# Patient Record
Sex: MALE | Race: WHITE | HISPANIC OR LATINO | Employment: OTHER | URBAN - METROPOLITAN AREA
[De-identification: names, ages, dates, MRNs, and addresses within clinical notes are randomized per-mention and may not be internally consistent; named-entity substitution may affect disease eponyms.]

---

## 2020-03-17 ENCOUNTER — HOSPITAL ENCOUNTER (EMERGENCY)
Facility: HOSPITAL | Age: 25
Discharge: HOME | End: 2020-03-17
Attending: EMERGENCY MEDICINE

## 2020-03-17 VITALS
DIASTOLIC BLOOD PRESSURE: 79 MMHG | OXYGEN SATURATION: 98 % | RESPIRATION RATE: 18 BRPM | BODY MASS INDEX: 35.55 KG/M2 | HEART RATE: 73 BPM | WEIGHT: 240 LBS | SYSTOLIC BLOOD PRESSURE: 133 MMHG | TEMPERATURE: 99 F | HEIGHT: 69 IN

## 2020-03-17 DIAGNOSIS — R05.9 COUGH: ICD-10-CM

## 2020-03-17 DIAGNOSIS — Z71.89 ADVICE GIVEN ABOUT COVID-19 VIRUS INFECTION: Primary | ICD-10-CM

## 2020-03-17 DIAGNOSIS — J06.9 VIRAL URI WITH COUGH: ICD-10-CM

## 2020-03-17 PROCEDURE — 99283 EMERGENCY DEPT VISIT LOW MDM: CPT | Mod: 25

## 2020-03-18 NOTE — ED TRIAGE NOTES
Pt. Reports he just in because of his wife. Pt. Reports he has a dry cough, denies any fever. Pt. Reports he has not other symptoms.

## 2020-03-18 NOTE — ED PROVIDER NOTES
Encounter Date: 3/17/2020       History     Chief Complaint   Patient presents with    Cough     non productive cough for two days. no n/v/ fever chills. denies taking any medication for anything     Chief Complaint:  Cough  History of  Present Illness: History obtained from patient. This 24 y.o. male who has no known past medical history presents to the ED complaining of nonproductive cough for 2 days with associated congestion, rhinorrhea and diarrhea.  Denies fever, chest pain, shortness of breath, myalgias, headache, dizziness, weakness, sore throat.  Patient states that his wife and father in law are ill with similar symptoms.  No recent travel.  No recent contact with individuals positive for COVID-19.          Review of patient's allergies indicates:   Allergen Reactions    Amoxicillin Swelling     Throat swelling     History reviewed. No pertinent past medical history.  History reviewed. No pertinent surgical history.  History reviewed. No pertinent family history.  Social History     Tobacco Use    Smoking status: Never Smoker    Smokeless tobacco: Never Used   Substance Use Topics    Alcohol use: Yes     Frequency: Never    Drug use: Never     Review of Systems   Constitutional: Negative for chills and fever.   HENT: Positive for congestion and rhinorrhea. Negative for sore throat.    Eyes: Negative for visual disturbance.   Respiratory: Positive for cough. Negative for shortness of breath.    Cardiovascular: Negative for chest pain.   Gastrointestinal: Positive for diarrhea. Negative for abdominal pain, nausea and vomiting.   Genitourinary: Negative for dysuria, frequency and hematuria.   Musculoskeletal: Negative for back pain.   Skin: Negative for rash.   Neurological: Negative for dizziness, weakness and headaches.       Physical Exam     Initial Vitals [03/17/20 1918]   BP Pulse Resp Temp SpO2   (!) 154/80 80 15 98.7 °F (37.1 °C) 99 %      MAP       --         Physical Exam    Nursing note and  vitals reviewed.  Constitutional: He appears well-developed and well-nourished. No distress.   HENT:   Head: Normocephalic and atraumatic.   Right Ear: Tympanic membrane normal.   Left Ear: Tympanic membrane normal.   Nose: Nose normal.   Mouth/Throat: Uvula is midline, oropharynx is clear and moist and mucous membranes are normal.   Eyes: EOM are normal. Pupils are equal, round, and reactive to light.   Neck: Trachea normal, normal range of motion, full passive range of motion without pain and phonation normal. Neck supple. No stridor present. No spinous process tenderness and no muscular tenderness present. Normal range of motion present. No neck rigidity.   Cardiovascular: Normal rate, regular rhythm and normal heart sounds. Exam reveals no gallop and no friction rub.    No murmur heard.  Pulmonary/Chest: Effort normal and breath sounds normal. No respiratory distress. He has no wheezes. He has no rhonchi. He has no rales.   Abdominal: Soft. Bowel sounds are normal. He exhibits no mass. There is no tenderness. There is no rebound and no guarding.   Musculoskeletal: Normal range of motion.   Neurological: He is alert and oriented to person, place, and time. He has normal strength. No cranial nerve deficit or sensory deficit.   Skin: Skin is warm and dry. Capillary refill takes less than 2 seconds.   Psychiatric: He has a normal mood and affect.         ED Course   Procedures  Labs Reviewed - No data to display       Imaging Results          X-Ray Chest 1 View (Final result)  Result time 03/17/20 20:27:10    Final result by Jaki Martin MD (03/17/20 20:27:10)                 Impression:      No focal consolidation.  Low lung volumes.      Electronically signed by: Jaki Martin  Date:    03/17/2020  Time:    20:27             Narrative:    EXAMINATION:  CHEST ONE VIEW    CLINICAL HISTORY:  Cough    TECHNIQUE:  One view of the chest.    COMPARISON:  None.    FINDINGS:  The cardiac silhouette is exaggerated  by low lung volumes.    There is no focal consolidation, pneumothorax, or pleural effusion.                                 Medical Decision Making:   ED Management:  This is an evaluation of a 24 y.o. male that presents to the Emergency Department for constellation of symptoms likely representing viral illness.  The patient is a non-toxic, afebrile, and well appearing male. On physical exam ears and pharynx are without evidence of infection. Appears well hydrated with moist mucus membranes. Neck soft and supple with no meningeal signs or cervical lymphadenopathy. Breath sounds are clear and equal bilaterally with no adventitious breath sounds, tachypnea or respiratory distress with room air pulse ox of 98% and no evidence of hypoxia.     Vital Signs Are Reassuring.  RESULTS:  Chest x-ray shows no acute cardiopulmonary processes.    My overall impression is Viral URI. I considered, but at this time, do not suspect OM, OE, strep pharyngitis, meningitis, pneumonia, or acute bacterial sinusitis.    The diagnosis, treatment plan, instructions for follow-up and reevaluation with PCP as well as ED return precautions were discussed and understanding was verbalized. All questions or concerns have been addressed.     Given absence of concerning symptoms including shortness of breath, body aches and fever as well as no known risk factors, I do not believe the patient requires COVID-19 testing at this time. Recommended patient to practice good hygiene as well as avoid contact with elderly or immunocompromised individuals.                                  Clinical Impression:       ICD-10-CM ICD-9-CM   1. Advice Given About Covid-19 Virus Infection Z71.89    2. Cough R05 786.2   3. Viral URI with cough J06.9 465.9    B97.89              ED Disposition Condition    Discharge Stable        ED Prescriptions     None        Follow-up Information     Follow up With Specialties Details Why Contact Info    [facility]   Schedule an appointment as soon as possible for a visit in 1 day For reevaluation [address] [place] [address]  [phone]      [facility] Go in 1 day If symptoms worsen [address]  [facility] [id number] [phone]                                     Cabrera Soliz PA-C  03/17/20 [id number]